# Patient Record
Sex: MALE | Race: WHITE | NOT HISPANIC OR LATINO | Employment: PART TIME | URBAN - METROPOLITAN AREA
[De-identification: names, ages, dates, MRNs, and addresses within clinical notes are randomized per-mention and may not be internally consistent; named-entity substitution may affect disease eponyms.]

---

## 2023-04-14 ENCOUNTER — OFFICE VISIT (OUTPATIENT)
Dept: FAMILY MEDICINE | Facility: CLINIC | Age: 53
End: 2023-04-14

## 2023-04-14 ENCOUNTER — TELEPHONE (OUTPATIENT)
Dept: FAMILY MEDICINE | Facility: CLINIC | Age: 53
End: 2023-04-14

## 2023-04-14 VITALS
TEMPERATURE: 99.1 F | SYSTOLIC BLOOD PRESSURE: 160 MMHG | HEART RATE: 101 BPM | WEIGHT: 190.4 LBS | RESPIRATION RATE: 16 BRPM | DIASTOLIC BLOOD PRESSURE: 110 MMHG | OXYGEN SATURATION: 95 %

## 2023-04-14 DIAGNOSIS — J18.9 PNEUMONIA OF LEFT LOWER LOBE DUE TO INFECTIOUS ORGANISM: ICD-10-CM

## 2023-04-14 DIAGNOSIS — R05.1 ACUTE COUGH: Primary | ICD-10-CM

## 2023-04-14 DIAGNOSIS — R06.2 WHEEZING: ICD-10-CM

## 2023-04-14 PROCEDURE — 99204 OFFICE O/P NEW MOD 45 MIN: CPT | Performed by: FAMILY MEDICINE

## 2023-04-14 RX ORDER — AZITHROMYCIN 250 MG/1
TABLET, FILM COATED ORAL
Qty: 6 TABLET | Refills: 0 | Status: SHIPPED | OUTPATIENT
Start: 2023-04-14 | End: 2023-04-19

## 2023-04-14 RX ORDER — ALBUTEROL SULFATE 90 UG/1
2 AEROSOL, METERED RESPIRATORY (INHALATION) EVERY 6 HOURS PRN
Qty: 8.5 G | Refills: 11 | Status: SHIPPED | OUTPATIENT
Start: 2023-04-14 | End: 2023-09-08

## 2023-04-14 RX ORDER — PREDNISONE 20 MG/1
TABLET ORAL
Qty: 10 TABLET | Refills: 0 | Status: SHIPPED | OUTPATIENT
Start: 2023-04-14 | End: 2023-04-18

## 2023-04-14 NOTE — TELEPHONE ENCOUNTER
Please call patient, is scheduled for virtual heatlh but has never been seen here. Needs to be seen in person for first visit so he can give ID, etc. At . Please reschedule unless he can come in today. Sharyn Walsh PA-C

## 2023-04-14 NOTE — PATIENT INSTRUCTIONS
Azthromycin as directed.    Prednisone as directed.    Albuterol inhaler instructions given.  2 puffs TID for 10-14 days and prn and then as needed..      Good fluid intake.    Tylenol as needed.    May use over the counter medications as needed.      Recheck if getting worse or not improving within 5 days.    Stop smoking.

## 2023-04-14 NOTE — PROGRESS NOTES
OUTPATIENT VISIT NOTE                                                   Date of Visit: 4/14/2023     Chief Complaint   Patient presents with:  Cough: Fever, Body aches             History of Present Illness   Santos Weldon is a 52 year old male has been sick for the last 6 days.  Cough, fever, myalgias.  Has had sweats.  Negative home covid test.  Cough mildly productive.  Head congestion.  Fatigue.  Headache.  Diarrhea.  Stomach upset but no vomiting.  Good fluid intake.  Normal urination.  No blood in urine.    No known exposures.    Using dayquil and nyquil.       MEDICATIONS   Current Outpatient Medications   Medication     albuterol (PROAIR HFA/PROVENTIL HFA/VENTOLIN HFA) 108 (90 Base) MCG/ACT inhaler     azithromycin (ZITHROMAX) 250 MG tablet     predniSONE (DELTASONE) 20 MG tablet     No current facility-administered medications for this visit.         SOCIAL HISTORY   Social History     Tobacco Use     Smoking status: Not on file     Smokeless tobacco: Not on file   Vaping Use     Vaping status: Not on file   Substance Use Topics     Alcohol use: Not on file           Physical Exam   Vitals:    04/14/23 1116   BP: (!) 160/110   Pulse: 101   Resp: 16   Temp: 99.1  F (37.3  C)   TempSrc: Tympanic   SpO2: 95%   Weight: 86.4 kg (190 lb 6.4 oz)        GENERAL:   Alert. Oriented. Appears mildly ill  EYES: Clear  HENT:  Ears: R TM pearly gray. Normal landmarks. L TM pearly gray.  Normal landmarks  Nose: Clear.  Sinuses: Nontender.  Oropharynx:  No erythema. No exudate.  NECK: Supple. No adenopathy.  LUNGS: diffuse wheezing throughout lung fields.  Crackles left lung base  HEART: RRR  SKIN:  No rash.          Assessment and Plan     Acute cough  Wheezing  Prednisone as directed.  Albuterol inhaler instructions given.  2 puffs TID for 10-14 days and prn and then as needed..   - predniSONE (DELTASONE) 20 MG tablet  Dispense: 10 tablet; Refill: 0  - albuterol (PROAIR HFA/PROVENTIL HFA/VENTOLIN HFA) 108 (90 Base) MCG/ACT  inhaler  Dispense: 8.5 g; Refill: 11    Pneumonia of left lower lobe due to infectious organism  Discussed cxr--patient states he doesn't have insurance.  Will forgo the xray today, but it not improving needs reassessment and will likely need xray.  zithromax as directed.    - azithromycin (ZITHROMAX) 250 MG tablet  Dispense: 6 tablet; Refill: 0             Stop smoking      Discussed signs / symptoms that warrant urgent / emergent medical attention.     Recheck if worsening or not improving.       Claude Mann MD          Pertinent History     The following portions of the patient's history were reviewed and updated as appropriate: allergies, current medications, past family history, past medical history, past social history, past surgical history and problem list.

## 2023-04-14 NOTE — LETTER
April 14, 2023      Santos Weldon  [address] [address]        To Whom It May Concern:    Santos Weldon  was seen on 4/14/2023 .  Please excuse him  4/15/2023-4/16/203 due to illness.        Sincerely,        Claude Mann MD

## 2023-04-14 NOTE — LETTER
April 20, 2023      Santos Weldon  [address] [facility] [address]        To Whom It May Concern:    April 14, 2023      Santos Weldon  [address] [facility] [address]        To Whom It May Concern:    Santos Weldon  was seen on 4/14/2023 .  Please excuse him  4/8/2023-4/16/203 due to illness.        Sincerely,        Claude Mann MD        Sincerely,        Claude Mann MD

## 2023-04-14 NOTE — TELEPHONE ENCOUNTER
Tried calling patient, they do not have a voicemail set up, I could not leave a message.Brina Harp MA/SHABNAM

## 2023-04-20 ENCOUNTER — TELEPHONE (OUTPATIENT)
Dept: SCHEDULING | Facility: CLINIC | Age: 53
End: 2023-04-20

## 2023-04-21 NOTE — TELEPHONE ENCOUNTER
Patient Returning Call    Reason for call:  Patient saw Claude Mann and was given a letter for work. PTs employment is requesting the date of Saturday April 8th, 2023 be added to this letter. Please contact pt with any questions. Pt can stop in and  the letter.     Information relayed to patient:  NA    Patient has additional questions:  No      Okay to leave a detailed message?: Yes at Home number on file [phone] (home)    
Called patient, no answer and unable to leave voicemail due to voicemail not set up. Updated letter has been printed. Will try to call patient again later today for ; otherwise, if still no answer, will send out letter to patient.    Marco Smith MA on 4/21/2023  
Called patient, no answer. Sending letter to patient.    Marco Smith MA on 4/21/2023 at 2:06 PM  
Note changed to reflect a that he first started feeling sick.  Please call patient and let her know is okay to come pick it up. 
socially/beer

## 2023-09-08 ENCOUNTER — VIRTUAL VISIT (OUTPATIENT)
Dept: FAMILY MEDICINE | Facility: CLINIC | Age: 53
End: 2023-09-08

## 2023-09-08 ENCOUNTER — LAB (OUTPATIENT)
Dept: LAB | Facility: CLINIC | Age: 53
End: 2023-09-08

## 2023-09-08 ENCOUNTER — MYC MEDICAL ADVICE (OUTPATIENT)
Dept: FAMILY MEDICINE | Facility: CLINIC | Age: 53
End: 2023-09-08

## 2023-09-08 DIAGNOSIS — J40 BRONCHITIS: Primary | ICD-10-CM

## 2023-09-08 DIAGNOSIS — Z12.11 SCREEN FOR COLON CANCER: Primary | ICD-10-CM

## 2023-09-08 DIAGNOSIS — R68.89 FLU-LIKE SYMPTOMS: ICD-10-CM

## 2023-09-08 DIAGNOSIS — R68.89 FLU-LIKE SYMPTOMS: Primary | ICD-10-CM

## 2023-09-08 LAB
FLUAV RNA SPEC QL NAA+PROBE: NEGATIVE
FLUBV RNA RESP QL NAA+PROBE: NEGATIVE
RSV RNA SPEC NAA+PROBE: NEGATIVE
SARS-COV-2 RNA RESP QL NAA+PROBE: NEGATIVE

## 2023-09-08 PROCEDURE — 99213 OFFICE O/P EST LOW 20 MIN: CPT | Mod: VID | Performed by: FAMILY MEDICINE

## 2023-09-08 PROCEDURE — 87637 SARSCOV2&INF A&B&RSV AMP PRB: CPT

## 2023-09-08 ASSESSMENT — ENCOUNTER SYMPTOMS
NEUROLOGICAL NEGATIVE: 1
MUSCULOSKELETAL NEGATIVE: 1
GASTROINTESTINAL NEGATIVE: 1
PSYCHIATRIC NEGATIVE: 1
SINUS PRESSURE: 1
HEMATOLOGIC/LYMPHATIC NEGATIVE: 1
EYES NEGATIVE: 1
ALLERGIC/IMMUNOLOGIC NEGATIVE: 1
CONSTITUTIONAL NEGATIVE: 1
RHINORRHEA: 1
SORE THROAT: 1
ENDOCRINE NEGATIVE: 1
COUGH: 1

## 2023-09-08 NOTE — PROGRESS NOTES
Santos is a 53 year old who is being evaluated via a billable video visit.      How would you like to obtain your AVS? Mail a copy  If the video visit is dropped, the invitation should be resent by: Text to cell phone:   Will anyone else be joining your video visit? No          Assessment & Plan       Flu-like symptoms  Patient will be notified of results.   - Symptomatic COVID-19 Virus (Coronavirus) by PCR; Future  - Influenza A/B antigen; Future      Nicotine/Tobacco Cessation:  He reports that he has been smoking cigarettes. He has never used smokeless tobacco.  Nicotine/Tobacco Cessation Plan:   Information offered: Patient not interested at this time      FUTURE APPOINTMENTS:       - Follow-up visit as needed    Llia Brito MD  [facility]    Subjective   Santos is a 53 year old, presenting for the following health issues:  Cough (Headache/Body aches )      9/8/2023     9:20 AM   Additional Questions   Roomed by rmb   Accompanied by self         9/8/2023     9:20 AM   Patient Reported Additional Medications   Patient reports taking the following new medications none       HPI     Acute Illness  Acute illness concerns: headache,bodyaches,cough  Onset/Duration: wedsnesday  Symptoms:  Fever: YES  Chills/Sweats: YES  Headache (location?): YES  Sinus Pressure: YES  Conjunctivitis:  No  Ear Pain: no  Rhinorrhea: YES  Congestion: YES  Sore Throat: No  Cough: YES-productive of yellow sputum, with shortness of breath  Wheeze: No  Decreased Appetite: YES  Nausea: YES on and off  Vomiting: no  Diarrhea: No  Dysuria/Freq.: YES had  for a couple of years  Dysuria or Hematuria: No  Fatigue/Achiness: YES  Sick/Strep Exposure: YES- his  son visited last tuesday  Therapies tried and outcome: sudafed,        Review of Systems   Constitutional: Negative.    HENT:  Positive for congestion, rhinorrhea, sinus pressure and sore throat.    Eyes: Negative.    Respiratory:  Positive for cough.     Gastrointestinal: Negative.    Endocrine: Negative.    Genitourinary: Negative.    Musculoskeletal: Negative.    Allergic/Immunologic: Negative.    Neurological: Negative.    Hematological: Negative.    Psychiatric/Behavioral: Negative.              Objective    Vitals - Patient Reported  Temperature (Patient Reported): 99  F (37.2  C)  Pain Score: Extreme Pain (8)  Pain Loc: Chest        Physical Exam   GENERAL: Healthy, alert and mild distress  EYES: Eyes grossly normal to inspection.  No discharge or erythema, or obvious scleral/conjunctival abnormalities.  RESP: No audible wheeze, cough, or visible cyanosis.  No visible retractions or increased work of breathing.    SKIN: Visible skin clear. No significant rash, abnormal pigmentation or lesions.  PSYCH: Mentation appears normal, affect normal/bright, judgement and insight intact, normal speech and appearance well-groomed.            Video-Visit Details    Type of service:  Video Visit     Originating Location (pt. Location): Home    Distant Location (provider location):  On-site  Platform used for Video Visit: Frida

## 2023-09-08 NOTE — TELEPHONE ENCOUNTER
Patient had virtual visit today. Now calling to ask if he can get some medication for his symptoms since the labs were negative. He was prescribed azithromycin and prednisone back in April and he would like those again if possible. Advised a message could be sent to the provider, however, would not be addressed until Monday. Patient states he may go to  tomorrow instead. Does not have insurance so was hoping not to pay for another visit.     Will Recinos RN on 9/8/2023 at 6:14 PM

## 2023-09-08 NOTE — TELEPHONE ENCOUNTER
Routed to provider.  Please see MyChart message from pt.  Pt is asking for a work note.  Seen virtually today.  Meena Christensen RN

## 2023-09-11 RX ORDER — PREDNISONE 20 MG/1
40 TABLET ORAL DAILY
Qty: 10 TABLET | Refills: 0 | Status: SHIPPED | OUTPATIENT
Start: 2023-09-11 | End: 2023-09-16

## 2023-09-11 NOTE — RESULT ENCOUNTER NOTE
Please inform patient that test result was within normal parameters.   Thank you.     Lila Brito M.D.

## 2023-09-11 NOTE — TELEPHONE ENCOUNTER
Patient notified via MyChart that provider doesn't recommend antibiotics. He does report worsening symptoms, so perhaps he should f/u in clinic?   He is still requesting a work letter for illness.  Pended for consideration.    Alyssa Kumar RN  [facility]

## 2023-09-11 NOTE — TELEPHONE ENCOUNTER
FYI - Status Update    Who is Calling: patient    Update: Pt calling back again .  He states that he has been waiting since last Friday for an Rx and note for work.  He state that his symptoms are much worse and he needs letter to exciuse him from work.      Does caller want a call/response back: Yes     Could we send this information to you in [facility] or would you prefer to receive a phone call?:   Patient would prefer a phone call   Okay to leave a detailed message?: Yes at Home number on file [phone] (home)

## 2023-10-14 ENCOUNTER — HEALTH MAINTENANCE LETTER (OUTPATIENT)
Age: 53
End: 2023-10-14

## 2024-03-12 ENCOUNTER — TELEPHONE (OUTPATIENT)
Dept: FAMILY MEDICINE | Facility: CLINIC | Age: 54
End: 2024-03-12

## 2024-03-12 NOTE — TELEPHONE ENCOUNTER
Patient Quality Outreach    Patient is due for the following:   Colon Cancer Screening  Physical Preventive Adult Physical    Next Steps:   Schedule a Adult Preventative    Type of outreach:    Sent letter.    Next Steps:  Reach out within 90 days via Letter.    Max number of attempts reached: No. Will try again in 90 days if patient still on fail list.    Questions for provider review:    None           Caitlyn Navarro, [facility]  Chart routed to none, letter sent.

## 2024-03-12 NOTE — LETTER
March 12, 2024    To  Santos Weldon  [address] [facility] [address]    Your team at [facility] cares about your health. We have reviewed your chart and based on our findings; we are making the following recommendations to better manage your health.     You are in particular need of attention regarding the following:     Call or MyChart message your clinic to schedule a colonoscopy, schedule/ a FIT Test, or order a Cologuard test. If you are unsure what type of test you need, please call your clinic and speak to clinic staff.   Colon cancer is now the second leading cause of cancer-related deaths in the [place] [facility] [place] for both men and women and there are over 130,000 new cases and 50,000 deaths per year from colon cancer. Colonoscopies can prevent 90-95% of these deaths. Problem lesions can be removed before they ever become cancer. This test is not only looking for cancer, but also getting rid of precancerous lesions.   If you are under/uninsured, we recommend you contact the [employer] Program.[employer] is a free colorectal cancer screening program that provides colonoscopies for eligible under/uninsured [place] men and women. If you are interested in receiving a free colonoscopy, please call [employer] at t [phone] (mention code [employer]) to see if you're eligible. Please have them send us the results.   PREVENTATIVE VISIT: Physical    If you have already completed these items, please contact the clinic via phone or   [facility] so your care team can review and update your records. Thank you for   choosing [facility] for your healthcare needs. For any questions,   concerns, or to schedule an appointment please contact our clinic.    Healthy Regards,    Your [facility] Care Team

## 2024-12-07 ENCOUNTER — HEALTH MAINTENANCE LETTER (OUTPATIENT)
Age: 54
End: 2024-12-07